# Patient Record
Sex: FEMALE | Race: WHITE | Employment: FULL TIME | URBAN - METROPOLITAN AREA
[De-identification: names, ages, dates, MRNs, and addresses within clinical notes are randomized per-mention and may not be internally consistent; named-entity substitution may affect disease eponyms.]

---

## 2019-10-14 ENCOUNTER — NURSE TRIAGE (OUTPATIENT)
Dept: NURSING | Facility: CLINIC | Age: 34
End: 2019-10-14

## 2019-10-15 NOTE — TELEPHONE ENCOUNTER
"Pt reports a lump on her R breast that she noticed this evening.  She denies warmth, discoloration, pain, fever.      Disposition:  See a provider within 2-3 days.  She verbalized understanding and had no further questions.     Mayra Lynne RN/FNA    Reason for Disposition    Breast lump    Additional Information    Negative: Chest pain    Negative: Patient is breastfeeding    Negative: Postpartum breast pain and swelling, not breastfeeding    Negative: Small spot, skin growth or mole    Negative: [1] SEVERE breast pain AND [2] fever > 103 F (39.4 C)    Negative: Patient sounds very sick or weak to the triager    Negative: [1Breast looks infected (spreading redness, feels hot or painful to touch) AND [2] fever    Negative: [1Breast looks infected (spreading redness, feels hot or painful to touch) AND [2] no fever    Negative: [1] Painful rash AND [2] multiple small blisters grouped together (i.e., dermatomal distribution or \"band\" or \"stripe\")    Negative: [1] Cuts, burns, or bruises of breasts AND [2] suspicious history for the injury    Protocols used: BREAST SYMPTOMS-A-AH      "

## 2019-10-16 ENCOUNTER — OFFICE VISIT (OUTPATIENT)
Dept: MIDWIFE SERVICES | Facility: CLINIC | Age: 34
End: 2019-10-16
Payer: MEDICAID

## 2019-10-16 VITALS
BODY MASS INDEX: 25.41 KG/M2 | HEART RATE: 101 BPM | WEIGHT: 161.9 LBS | HEIGHT: 67 IN | SYSTOLIC BLOOD PRESSURE: 131 MMHG | DIASTOLIC BLOOD PRESSURE: 80 MMHG

## 2019-10-16 DIAGNOSIS — N63.0 LUMP OR MASS IN BREAST: Primary | ICD-10-CM

## 2019-10-16 PROCEDURE — 99203 OFFICE O/P NEW LOW 30 MIN: CPT | Performed by: ADVANCED PRACTICE MIDWIFE

## 2019-10-16 SDOH — HEALTH STABILITY: MENTAL HEALTH: HOW OFTEN DO YOU HAVE A DRINK CONTAINING ALCOHOL?: MONTHLY OR LESS

## 2019-10-16 ASSESSMENT — MIFFLIN-ST. JEOR: SCORE: 1472.12

## 2019-10-16 NOTE — PROGRESS NOTES
"S: Manuel is here for a new breast mass. She found a hard, marble size mass in her right breast, as well as some thickened tissue above it. First noticed it on Monday. No pain. Her grandmother is just finishing treatment from breast cancer, so she is feeling very anxious about this finding. She states that she has very firm, fibrous breasts, and is concerned that we will not be able to detect changes.    Past Medical History:   Diagnosis Date     Depression with anxiety        No past surgical history on file.    Family History   Problem Relation Age of Onset     Breast Cancer Maternal Grandmother         just finished treatment 10/2019     Coronary Artery Disease Maternal Grandmother      Hypertension Maternal Grandmother      Hyperlipidemia Maternal Grandmother      GERD Maternal Grandmother      Breast Cancer Other         dad's cousin     Asthma Mother      Hypertension Mother      Migraines Mother      Arthritis Father      Hypertension Father      Cancer Brother      Migraines Brother      Coronary Artery Disease Maternal Grandfather      Hypertension Maternal Grandfather      Hyperlipidemia Maternal Grandfather      Clotting Disorder Maternal Grandfather         blood clots     Coronary Artery Disease Paternal Grandmother      Hypertension Paternal Grandmother      Hyperlipidemia Paternal Grandmother      Hypertension Paternal Grandfather      Cancer Paternal Grandfather      Hyperlipidemia Paternal Grandfather        Social History     Tobacco Use     Smoking status: Never Smoker     Smokeless tobacco: Never Used   Substance Use Topics     Alcohol use: Yes     Frequency: Monthly or less       O:/80   Pulse 101   Ht 1.71 m (5' 7.32\")   Wt 73.4 kg (161 lb 14.4 oz)   LMP 09/24/2019 (Exact Date)   BMI 25.11 kg/m    Exam:  Constitutional: healthy, alert and teary  Psychiatric: mentation appears normal and anxious  Breasts: mass of size 1.5 cm noted in right breast along nipple border at 12 o'clock. Area " of thickened tissue felt adjacent to mass. No masses or densities felt in left breast.    A/P:  (N63.0) Lump or mass in breast  (primary encounter diagnosis)  Plan: MA Diagnostic Digital Bilateral, US Breast         Right Complete 4 Quadrants    Esequiel Espinoza, HARPREETM

## 2019-10-16 NOTE — PROGRESS NOTES
"Chief Complaint   Patient presents with     Breast Mass     right breast lump - history of fibrous breasts. tender. felt it about two nights ago. family history of breast cancer - maternal grandmother and dad's cousin       Initial /80   Pulse 101   Ht 1.71 m (5' 7.32\")   Wt 73.4 kg (161 lb 14.4 oz)   LMP 09/24/2019 (Exact Date)   BMI 25.11 kg/m   Estimated body mass index is 25.11 kg/m  as calculated from the following:    Height as of this encounter: 1.71 m (5' 7.32\").    Weight as of this encounter: 73.4 kg (161 lb 14.4 oz).  BP completed using cuff size: regular    Questioned patient about current smoking habits.  Pt. has never smoked.      No obstetric history on file.    The following HM Due: pap smear - has been a \"really long time since last pap\"        patient has appointment for today            "

## 2019-10-18 ENCOUNTER — ANCILLARY PROCEDURE (OUTPATIENT)
Dept: MAMMOGRAPHY | Facility: CLINIC | Age: 34
End: 2019-10-18
Payer: MEDICAID

## 2019-10-18 DIAGNOSIS — N63.0 LUMP OR MASS IN BREAST: ICD-10-CM

## 2019-10-21 ENCOUNTER — ANCILLARY PROCEDURE (OUTPATIENT)
Dept: MAMMOGRAPHY | Facility: CLINIC | Age: 34
End: 2019-10-21
Attending: ADVANCED PRACTICE MIDWIFE
Payer: MEDICAID

## 2019-10-21 DIAGNOSIS — N63.0 LUMP OR MASS IN BREAST: ICD-10-CM

## 2019-10-21 PROCEDURE — 88305 TISSUE EXAM BY PATHOLOGIST: CPT | Performed by: ADVANCED PRACTICE MIDWIFE

## 2019-10-21 RX ORDER — IOPAMIDOL 612 MG/ML
100 INJECTION, SOLUTION INTRAVASCULAR ONCE
Status: COMPLETED | OUTPATIENT
Start: 2019-10-21 | End: 2019-10-21

## 2019-10-21 RX ORDER — LIDOCAINE HYDROCHLORIDE 10 MG/ML
10 INJECTION, SOLUTION EPIDURAL; INFILTRATION; INTRACAUDAL; PERINEURAL ONCE
Status: COMPLETED | OUTPATIENT
Start: 2019-10-21 | End: 2019-10-21

## 2019-10-21 RX ADMIN — IOPAMIDOL 100 ML: 612 INJECTION, SOLUTION INTRAVASCULAR at 09:04

## 2019-10-21 RX ADMIN — LIDOCAINE HYDROCHLORIDE 10 ML: 10 INJECTION, SOLUTION EPIDURAL; INFILTRATION; INTRACAUDAL; PERINEURAL at 09:26

## 2019-10-22 ENCOUNTER — TELEPHONE (OUTPATIENT)
Dept: MAMMOGRAPHY | Facility: CLINIC | Age: 34
End: 2019-10-22

## 2019-10-22 NOTE — TELEPHONE ENCOUNTER
Spoke to Manuel about a blister that had formed on her right breast due to the steri strips that she has placed after her breast biopsy.  She removed the steri strips this morning and the area has improved slightly.  Encouraged Anik to cover the biopsy insertion site with a band-aid that works for her and use vasaline if she would like over the area that the blister is.  Also if the are is itchy, an ice pack was recommended if she needs it to help calm the area.

## 2019-10-23 LAB — COPATH REPORT: NORMAL

## 2019-10-24 ENCOUNTER — TELEPHONE (OUTPATIENT)
Dept: MAMMOGRAPHY | Facility: CLINIC | Age: 34
End: 2019-10-24

## 2019-10-24 NOTE — TELEPHONE ENCOUNTER
Spoke to Anik about the benign findings from her bilateral breast biopsies done earlier this week.  We discussed the Radiologist's recommendation of following up with her Primary MD for the bilateral lumps if any changes are noted and to also start screening mammograms at age 40.  Anik also stated that the blister that had started to form due to the steri strips is improving.  Anik verbalized understanding and all questions and concern were answered at this time.